# Patient Record
Sex: MALE | Race: WHITE | NOT HISPANIC OR LATINO
[De-identification: names, ages, dates, MRNs, and addresses within clinical notes are randomized per-mention and may not be internally consistent; named-entity substitution may affect disease eponyms.]

---

## 2019-02-03 ENCOUNTER — OTHER (OUTPATIENT)
Age: 39
End: 2019-02-03

## 2021-02-25 ENCOUNTER — APPOINTMENT (OUTPATIENT)
Dept: COLORECTAL SURGERY | Facility: CLINIC | Age: 41
End: 2021-02-25
Payer: COMMERCIAL

## 2021-02-25 VITALS
DIASTOLIC BLOOD PRESSURE: 79 MMHG | SYSTOLIC BLOOD PRESSURE: 121 MMHG | RESPIRATION RATE: 15 BRPM | OXYGEN SATURATION: 100 % | WEIGHT: 175 LBS | TEMPERATURE: 97.2 F | HEIGHT: 73 IN | BODY MASS INDEX: 23.19 KG/M2 | HEART RATE: 86 BPM

## 2021-02-25 DIAGNOSIS — K92.1 MELENA: ICD-10-CM

## 2021-02-25 DIAGNOSIS — K64.9 UNSPECIFIED HEMORRHOIDS: ICD-10-CM

## 2021-02-25 PROCEDURE — 99072 ADDL SUPL MATRL&STAF TM PHE: CPT

## 2021-02-25 PROCEDURE — 99204 OFFICE O/P NEW MOD 45 MIN: CPT | Mod: 25

## 2021-02-25 PROCEDURE — 46221 LIGATION OF HEMORRHOID(S): CPT

## 2021-02-25 PROCEDURE — A4550 SURGICAL TRAYS: CPT

## 2021-02-25 NOTE — ASSESSMENT
[FreeTextEntry1] : Internal and external hemorrhoids with rectal bleeding\par -We discussed treatment options for the patient's hemorrhoidal disease at length\par -I recommended patient initiate fiber supplementation Daily\par -Patient with one large internal hemorrhoid I recommended he undergo rubber band ligation we discussed risks and benefits of this procedure the patient elected to proceed\par -Rubber band ligation was performed on the right anterior internal hemorrhoid without complication patient tolerated without event\par -We discussed external hemorrhoidal disease. Ideally I would monitor the external tissue, however patient reports intermittent swelling and discomfort. Treatment would consist of surgical excision. I recommended initial attempt at increased fiber intake and supplementation. Patient agreed to this plan\par -Patient followup in 2-4 weeks for reevaluation if bleeding is significantly improved from treatment of large right anterior internal hemorrhoid will continue to monitor. If persistent swelling externally, will further discuss surgical excision\par -All questions answered\par -Patient to schedule followup as noted

## 2021-02-25 NOTE — HISTORY OF PRESENT ILLNESS
[FreeTextEntry1] : Patient is a 39 yo male here to discuss his hemorrhoids. Patient reports bleeding intermittently for the last 10 years. Occasional external swelling with discomfort. No pain with bowel movements. No nausea or vomiting. Patient with history of 2 colonoscopies in the past. Last one showed internal and external hemorrhoids with possible ulcer. Patient denies abdominal pain no nausea or vomiting tolerating diet no weight loss or gain. No family history of colon cancer or inflammatory bowel disease. Daily bowel movements denies straining blood noticed multiple times per week

## 2021-02-25 NOTE — CONSULT LETTER
[Dear  ___] : Dear  [unfilled], [Consult Letter:] : I had the pleasure of evaluating your patient, [unfilled]. [Please see my note below.] : Please see my note below. [Consult Closing:] : Thank you very much for allowing me to participate in the care of this patient.  If you have any questions, please do not hesitate to contact me. [Sincerely,] : Sincerely, [FreeTextEntry2] : Samuel Lyons [FreeTextEntry3] : Malachi Felder MD FACS\par Chief Colon and Rectal Surgery\par [facility]